# Patient Record
Sex: FEMALE | Employment: FULL TIME | URBAN - METROPOLITAN AREA
[De-identification: names, ages, dates, MRNs, and addresses within clinical notes are randomized per-mention and may not be internally consistent; named-entity substitution may affect disease eponyms.]

---

## 2018-04-04 ENCOUNTER — OFFICE VISIT (OUTPATIENT)
Dept: NEUROLOGY | Age: 58
End: 2018-04-04

## 2018-04-04 VITALS
HEART RATE: 65 BPM | SYSTOLIC BLOOD PRESSURE: 134 MMHG | DIASTOLIC BLOOD PRESSURE: 88 MMHG | WEIGHT: 241 LBS | RESPIRATION RATE: 18 BRPM | BODY MASS INDEX: 42.7 KG/M2 | HEIGHT: 63 IN | OXYGEN SATURATION: 98 %

## 2018-04-04 DIAGNOSIS — Z84.89 FAMILY HISTORY OF BRAIN TUMOR: ICD-10-CM

## 2018-04-04 DIAGNOSIS — R20.2 PARESTHESIA: Primary | ICD-10-CM

## 2018-04-04 DIAGNOSIS — H93.19 TINNITUS, UNSPECIFIED LATERALITY: ICD-10-CM

## 2018-04-04 RX ORDER — LORATADINE 10 MG/1
10 TABLET ORAL
COMMUNITY

## 2018-04-04 NOTE — LETTER
4/4/2018 8:31 AM 
 
Patient:  Rhett Bray YOB: 1960 Date of Visit: 4/4/2018 Dear Soraya Isabel, [facility] [address]: [phone] 
 : 
 
 
Thank you for referring Ms. Rhett Bray to me for evaluation/treatment. Below are the relevant portions of my assessment and plan of care. If you have questions, please do not hesitate to call me. I look forward to following Ms. Huey Guzman along with you.  
 
 
 
Sincerely, 
 
 
Usha Root, DO

## 2018-04-04 NOTE — MR AVS SNAPSHOT
[facility] [address] [place] [address] 
[phone] Patient: Jyothi Sanchez MRN: [id number] :1960 Visit Information Date & Time Provider Department Dept. Phone Encounter #  
 2018  8:00 AM Jodene Favre, DO [facility] at [address] [id number] Follow-up Instructions Return if symptoms worsen or fail to improve. Upcoming Health Maintenance Date Due Hepatitis C Screening 1960 DTaP/Tdap/Td series (1 - Tdap) 1982 PAP AKA CERVICAL CYTOLOGY 1982 BREAST CANCER SCRN MAMMOGRAM 2011 FOBT Q 1 YEAR AGE 50-75 2011 Influenza Age 5 to Adult 2017 Allergies as of 2018  Review Complete On: 2018 By: Lloyd Morel LPN Severity Noted Reaction Type Reactions Mold  2018    Hives Other Plant, Animal, Environmental  2018    Hives  
 pollen Pcn [Penicillins]  2018    Hives Current Immunizations  Never Reviewed No immunizations on file. Not reviewed this visit You Were Diagnosed With   
  
 Codes Comments Paresthesia    -  Primary ICD-10-CM: R20.2 ICD-9-CM: 782.0 Tinnitus, unspecified laterality     ICD-10-CM: H93.19 ICD-9-CM: 388.30 Family history of brain tumor     ICD-10-CM: Z84.89 ICD-9-CM: V19.8 Vitals BP Pulse Resp Height(growth percentile) Weight(growth percentile) SpO2  
 134/88 65 18 5' 3\" (1.6 m) 241 lb (109.3 kg) 98% BMI OB Status Smoking Status 42.69 kg/m2 Hysterectomy Never Smoker Vitals History BMI and BSA Data Body Mass Index Body Surface Area  
 42.69 kg/m 2 2.2 m 2 Your Updated Medication List  
  
   
This list is accurate as of 18  8:26 AM.  Always use your most recent med list.  
  
  
  
  
 CLARITIN 10 mg tablet Generic drug:  loratadine Take 10 mg by mouth. losartan 50 mg tab 50 mg, hydroCHLOROthiazide 12.5 mg cap 12.5 mg Take  by mouth daily. Follow-up Instructions Return if symptoms worsen or fail to improve. To-Do List   
 04/04/2018 Imaging:  MRI BRAIN W WO CONT Patient Instructions A Healthy Lifestyle: Care Instructions Your Care Instructions A healthy lifestyle can help you feel good, stay at a healthy weight, and have plenty of energy for both work and play. A healthy lifestyle is something you can share with your whole family. A healthy lifestyle also can lower your risk for serious health problems, such as high blood pressure, heart disease, and diabetes. You can follow a few steps listed below to improve your health and the health of your family. Follow-up care is a key part of your treatment and safety. Be sure to make and go to all appointments, and call your doctor if you are having problems. It's also a good idea to know your test results and keep a list of the medicines you take. How can you care for yourself at home? · Do not eat too much sugar, fat, or fast foods. You can still have dessert and treats now and then. The goal is moderation. · Start small to improve your eating habits. Pay attention to portion sizes, drink less juice and soda pop, and eat more fruits and vegetables. ¨ Eat a healthy amount of food. A 3-ounce serving of meat, for example, is about the size of a deck of cards. Fill the rest of your plate with vegetables and whole grains. ¨ Limit the amount of soda and sports drinks you have every day. Drink more water when you are thirsty. ¨ Eat at least 5 servings of fruits and vegetables every day. It may seem like a lot, but it is not hard to reach this goal. A serving or helping is 1 piece of fruit, 1 cup of vegetables, or 2 cups of leafy, raw vegetables.  Have an apple or some carrot sticks as an afternoon snack instead of a candy bar. Try to have fruits and/or vegetables at every meal. 
· Make exercise part of your daily routine. You may want to start with simple activities, such as walking, bicycling, or slow swimming. Try to be active 30 to 60 minutes every day. You do not need to do all 30 to 60 minutes all at once. For example, you can exercise 3 times a day for 10 or 20 minutes. Moderate exercise is safe for most people, but it is always a good idea to talk to your doctor before starting an exercise program. 
· Keep moving. Andreia Peterson the lawn, work in the garden, or [place]. Take the stairs instead of the elevator at work. · If you smoke, quit. People who smoke have an increased risk for heart attack, stroke, cancer, and other lung illnesses. Quitting is hard, but there are ways to boost your chance of quitting tobacco for good. ¨ Use nicotine gum, patches, or lozenges. ¨ Ask your doctor about stop-smoking programs and medicines. ¨ Keep trying. In addition to reducing your risk of diseases in the future, you will notice some benefits soon after you stop using tobacco. If you have shortness of breath or asthma symptoms, they will likely get better within a few weeks after you quit. · Limit how much alcohol you drink. Moderate amounts of alcohol (up to 2 drinks a day for men, 1 drink a day for women) are okay. But drinking too much can lead to liver problems, high blood pressure, and other health problems. Family health If you have a family, there are many things you can do together to improve your health. · Eat meals together as a family as often as possible. · Eat healthy foods. This includes fruits, vegetables, lean meats and dairy, and whole grains. · Include your family in your fitness plan. Most people think of activities such as jogging or tennis as the way to fitness, but there are many ways you and your family can be more active.  Anything that makes you breathe hard and gets your heart pumping is exercise. Here are some tips: 
¨ Walk to do errands or to take your child to school or the bus. ¨ Go for a family bike ride after dinner instead of watching TV. Where can you learn more? Go to http://rony-julianne.info/. Enter N174 in the search box to learn more about \"A Healthy Lifestyle: Care Instructions. \" Current as of: May 12, 2017 Content Version: 11.4 © 8089-0509 [employer]. Care instructions adapted under license by [employer] (which disclaims liability or warranty for this information). If you have questions about a medical condition or this instruction, always ask your healthcare professional. [address] any warranty or liability for your use of this information. Introducing [facility]! [employer] introduces [employer] patient portal. Now you can access parts of your medical record, email your doctor's office, and request medication refills online. 1. In your internet browser, go to [email or web address] 2. Click on the First Time User? Click Here link in the Sign In box. You will see the New Member Sign Up page. 3. Enter your [employer]eva Access Code exactly as it appears below. You will not need to use this code after youve completed the sign-up process. If you do not sign up before the expiration date, you must request a new code. · [employer]eva Access Code: [email or web address] Expires: 7/3/2018  7:43 AM 
 
4. Enter the last four digits of your Social Security Number (xxxx) and Date of Birth (mm/dd/yyyy) as indicated and click Submit. You will be taken to the next sign-up page. 5. Create a Veeva ID. This will be your Veeva login ID and cannot be changed, so think of one that is secure and easy to remember. 6. Create a [employer]eva password. You can change your password at any time. 7. Enter your Password Reset Question and Answer.  This can be used at a later time if you forget your password. 8. Enter your e-mail address. You will receive e-mail notification when new information is available in [address] 9. Click Sign Up. You can now view and download portions of your medical record. 10. Click the Download Summary menu link to download a portable copy of your medical information. If you have questions, please visit the Frequently Asked Questions section of the [employer] website. Remember, [employer] is NOT to be used for urgent needs. For medical emergencies, dial 911. Now available from your iPhone and Android! Please provide this summary of care documentation to your next provider. If you have any questions after today's visit, please call [phone].

## 2018-04-04 NOTE — PROGRESS NOTES
[facility]   NEW PATIENT EVALUATION/CONSULTATION       PATIENT NAME: Eusebio Pulliam    MRN: [id number]    REASON FOR CONSULTATION: L head paresthesias    04/04/18      Previous records (physician notes, laboratory reports, and radiology reports) and imaging studies were reviewed and summarized. My recommendations will be communicated back to the patient's physician(s) via electronic medical record and/or by [address] [facility] [facility] mail. HISTORY OF PRESENT ILLNESS:  Eusebio Pluliam is a 63 y.o. right handed female presenting for evaluation of L sided paresthesias. Sx started approximately 3 weeks ago, intermittent and non-progressive since onset. She describes sx as tingling above her L ear with radiating to the occipital region lasting just a few seconds at most.  This occurs several times daily. Reports chronic intermittent tinnitus as well as mild headaches which are occasional.  She states headache are over the left frontal region without associated N/V, photophobia. Denies facial weakness/limb weakness, vision disturbance, dysarthria, dysphagia. She denies h/o paresthesias in the past.  No recent head injury/concussion. She is concerned regarding her +FHx father with brain tumor. No prior brain imaging.         PAST MEDICAL HISTORY:  Past Medical History:   Diagnosis Date    Arthritis     Hypertension     Obesity        PAST SURGICAL HISTORY:  Past Surgical History:   Procedure Laterality Date    HX GYN      HX MYOMECTOMY         FAMILY HISTORY:   Family History   Problem Relation Age of Onset    Diabetes Mother     Hypertension Mother     Cancer Father     Dementia Sister     Diabetes Maternal Grandmother     Hypertension Maternal Grandmother          SOCIAL HISTORY:  Social History     Social History    Marital status: UNKNOWN     Spouse name: N/A    Number of children: N/A    Years of education: N/A     Social History Main Topics    Smoking status: Never Smoker    Smokeless tobacco: Never Used    Alcohol use Yes    Drug use: None    Sexual activity: Not Asked     Other Topics Concern    None     Social History Narrative    None         MEDICATIONS:   Current Outpatient Prescriptions   Medication Sig Dispense Refill    losartan 50 mg tab 50 mg, hydroCHLOROthiazide 12.5 mg cap 12.5 mg Take  by mouth daily.  loratadine (CLARITIN) 10 mg tablet Take 10 mg by mouth. ALLERGIES:  Allergies   Allergen Reactions    Mold Hives    Pcn [Penicillins] Hives         REVIEW OF SYSTEMS:  10 point ROS reviewed with patient. Please see scanned document under media. PHYSICAL EXAM:  Vital Signs:   Visit Vitals    /88    Pulse 65    Resp 18    Wt 109.3 kg (241 lb)        General Medical Exam:  General:  Well appearing, comfortable, in no apparent distress. Eyes/ENT: see cranial nerve examination. Neck: No masses appreciated. Full range of motion without tenderness. Respiratory:  Clear to auscultation, good air entry bilaterally. Cardiac:  Regular rate and rhythm, no murmur. GI:  Soft, non-tender, non-distended abdomen. Bowel sounds normal. No masses, organomegaly. Extremities:  No deformities, edema, or skin discoloration. Skin:  No rashes or lesions. Neurological:  · Mental Status:  Alert and oriented to person, place, and time with fluent speech. · Cranial Nerves:   CNII/III/IV/VI: no papilledema, visual fields full to confrontation, EOMI, PERRL, no ptosis or nystagmus. CN V: Facial sensation intact bilaterally, masseter 5/5   CN VII: Facial muscles symmetric and strong   CN VIII: Hears finger rub well bilaterally, intact vestibular function   CN IX/X: Normal palatal movement   CN XI: Full strength shoulder shrug bilaterally   CN XII: Tongue protrusion full and midline without fasciculation or atrophy  · Motor: Normal tone and muscle bulk with no pronator drift.    Individual muscle group testing:  Shoulder abduction:   Left:5/5   Right : 5/5    Shoulder adduction: Left:5/5   Right : 5/5    Elbow flexion:      Left:5/5   Right : 5/5  Elbow extension:    Left:5/5   Right : 5/5   Wrist flexion:    Left:5/5   Right : 5/5  Wrist extension:    Left:5/5   Right : 5/5  Arm pronation:   Left:5/5   Right : 5/5  Arm supination:   Left:5/5   Right : 5/5    Finger flexion:    Left:5/5   Right : 5/5    Finger extension:   Left:5/5   Right : 5/5   Finger abduction:  Left:5/5   Right : 5/5   Finger adduction:   Left:5/5   Right : 5/5  Hip flexion:     Left:5/5   Right : 5/5         Hip extension:   Left:5/5   Right : 5/5    Knee flexion:     Left:5/5   Right : 5/5    Knee extension:   Left:5/5   Right : 5/5    Dorsiflexion:     Left:5/5   Right : 5/5  Plantar flexion:    Left:5/5   Right : 5/5      · MSRs: No crossed adductors or clonus. RIGHT  LEFT   Brachioradialis 1+ 1+   Biceps 1+ 1+   Triceps 1+ 1+   Knee 1+ 1+   Achilles 1+ 1+        Plantar response Downward Downward          · Sensation: Normal and symmetric perception of pinprick, temperature, light touch, proprioception, and vibration; (-) Romberg. · Coordination: No dysmetria. Normal rapid alternating movements; finger-to-nose and heel-to- shin testing are within normal limits. · Gait: Normal native gait    PERTINENT DATA:  OUTSIDE RECORDS:  The patient provided outside medical records, which were reviewed during the course of the visit. The relevant detail are summarized above. ASSESSMENT:      ICD-10-CM ICD-9-CM    1. Paresthesia R20.2 782.0 losartan 50 mg tab 50 mg, hydroCHLOROthiazide 12.5 mg cap 12.5 mg      loratadine (CLARITIN) 10 mg tablet      MRI BRAIN W WO CONT   2. Tinnitus, unspecified laterality H93.19 388.30 losartan 50 mg tab 50 mg, hydroCHLOROthiazide 12.5 mg cap 12.5 mg      loratadine (CLARITIN) 10 mg tablet      MRI BRAIN W WO CONT   3.  Family history of brain tumor Z84.89 V19.8 losartan 50 mg tab 50 mg, hydroCHLOROthiazide 12.5 mg cap 12.5 mg      loratadine (CLARITIN) 10 mg tablet      MRI BRAIN W WO CONT   63year old AAF presenting with new onset intermittent paresthesias of the L hemisphere x 3 weeks as well as chronic intermittent tinnitus and non-migrainous occasional headaches. Neurological examination is non-focal.  She is very concerned regarding her acute neurological symptoms due to her father's history of GBM. Will obtain MRI brain to exclude intracranial mass/lesion or other acute pathology given her family history of brain tumor. If imaging is unrevealing, L hemispheric paresthesias are most likely secondary to headache-related phenomenon. She was instructed to return with any new or worsening symptoms. PLAN:  · MRI Brain WWO    Follow-up Disposition:  Return if symptoms worsen or fail to improve. I have discussed the diagnosis with the patient and the intended plan as seen in the above orders. Patient is in agreement. The patient has received an after-visit summary and questions were answered concerning future plans. Theodora Whyte DO  Staff Neurologist  Diplomate, [employer] & Neurology       CC Referring provider:  Soraya GEE

## 2018-04-04 NOTE — PATIENT INSTRUCTIONS

## 2018-04-18 ENCOUNTER — HOSPITAL ENCOUNTER (OUTPATIENT)
Dept: MRI IMAGING | Age: 58
Discharge: HOME | End: 2018-04-18
Attending: PSYCHIATRY & NEUROLOGY
Payer: COMMERCIAL

## 2018-04-18 VITALS — WEIGHT: 235 LBS | BODY MASS INDEX: 41.63 KG/M2

## 2018-04-18 DIAGNOSIS — Z84.89 FAMILY HISTORY OF BRAIN TUMOR: ICD-10-CM

## 2018-04-18 DIAGNOSIS — R20.2 PARESTHESIA: ICD-10-CM

## 2018-04-18 DIAGNOSIS — H93.19 TINNITUS, UNSPECIFIED LATERALITY: ICD-10-CM

## 2018-04-18 LAB — CREAT BLD-MCNC: 0.9 MG/DL (ref 0.6–1.3)

## 2018-04-18 PROCEDURE — A9575 INJ GADOTERATE MEGLUMI 0.1ML: HCPCS | Performed by: PSYCHIATRY & NEUROLOGY

## 2018-04-18 PROCEDURE — 74011250636 HC RX REV CODE- 250/636: Performed by: PSYCHIATRY & NEUROLOGY

## 2018-04-18 PROCEDURE — 82565 ASSAY OF CREATININE: CPT

## 2018-04-18 PROCEDURE — 70553 MRI BRAIN STEM W/O & W/DYE: CPT

## 2018-04-18 RX ORDER — GADOTERATE MEGLUMINE 376.9 MG/ML
20 INJECTION INTRAVENOUS
Status: COMPLETED | OUTPATIENT
Start: 2018-04-18 | End: 2018-04-18

## 2018-04-18 RX ORDER — SODIUM CHLORIDE 0.9 % (FLUSH) 0.9 %
10 SYRINGE (ML) INJECTION
Status: COMPLETED | OUTPATIENT
Start: 2018-04-18 | End: 2018-04-18

## 2018-04-18 RX ADMIN — Medication 10 ML: at 21:00

## 2018-04-18 RX ADMIN — GADOTERATE MEGLUMINE 20 ML: 376.9 INJECTION INTRAVENOUS at 21:00

## 2020-03-26 ENCOUNTER — VIRTUAL VISIT (OUTPATIENT)
Dept: NEUROLOGY | Age: 60
End: 2020-03-26

## 2020-03-26 VITALS — RESPIRATION RATE: 18 BRPM

## 2020-03-26 DIAGNOSIS — R20.2 PARESTHESIA OF RIGHT LOWER EXTREMITY: Primary | ICD-10-CM

## 2020-03-26 DIAGNOSIS — M54.10 RADICULAR PAIN OF RIGHT LOWER EXTREMITY: ICD-10-CM

## 2020-03-26 RX ORDER — GABAPENTIN 300 MG/1
300 CAPSULE ORAL 3 TIMES DAILY
Qty: 90 CAP | Refills: 2 | Status: SHIPPED | OUTPATIENT
Start: 2020-03-26 | End: 2020-07-01 | Stop reason: SDUPTHER

## 2020-03-26 NOTE — PROGRESS NOTES
Neurology Clinic Follow up Note    Patient ID:  Alyssa Abraham  [id number]  54 y.o.  1960      Ms. Saint Dolphin is here for follow up today of: New concern, tingling/burning RLE     This is a telemedicine visit that was performed with the originating site at [address] [place] [address] and the distant site at patient's home. Verbal consent to participate in video visit was obtained. The patient was identified by name and date of birth. This visit occurred during the Coronavirus [phone] [facility] Emergency. I discussed with the patient the nature of our telemedicine visits, that:     I would evaluate the patient and recommend diagnostics and treatments based on my assessment   Our sessions are not being recorded and that personal health information is protected   Our team would provide follow up care in person if/when the patient needs it    Interval History:   She reports a tingling/burning sensation into the RLE, intermittent, without associated LBP. Sx started 3-4 years ago. Paresthesias are more apparent over the past 3-4 months. She notes occasional weakness into the LLE, not RLE. No prior EMGs. Symptoms are more bothersome in the evenings. Heat will occasionally help. She had prior Xrays of the lumbar spine without significant findings per patient. PMHx/ PSHx/ FHx/ SHx:  Reviewed and unchanged previous visit. Past Medical History:   Diagnosis Date    Arthritis     Hypertension     Obesity          ROS:  Comprehensive review of systems negative except for as noted above. Objective:       Meds:  Current Outpatient Medications   Medication Sig Dispense Refill    losartan 50 mg tab 50 mg, hydroCHLOROthiazide 12.5 mg cap 12.5 mg Take  by mouth daily.  loratadine (CLARITIN) 10 mg tablet Take 10 mg by mouth. Due to this being a TeleHealth evaluation, many elements of the physical examination are unable to be assessed.    Exam:  NEUROLOGICAL EXAM:  General: Awake, alert, speech fluent  CN: EOMI, facial strength normal and symmetric, hearing is intact  Motor: AG x 4  Reflexes: deferred  Coordination: No ataxia. Sensation: LT intact throughout  Gait: Steady, intact heel/toe walking. LABS  Results for orders placed or performed during the hospital encounter of 04/18/18   POC CREATININE   Result Value Ref Range    Creatinine (POC) 0.9 0.6 - 1.3 mg/dL    GFRAA, POC >60 >60 ml/min/1.73m2    GFRNA, POC >60 >60 ml/min/1.73m2       IMAGING:  MRI Results (most recent):  Results from [place] encounter on 04/18/18   MRI BRAIN W WO CONT    Narrative INDICATION:  L hemispheric paresthesias, FHx GBM     COMPARISON:  None    TECHNIQUE:  MR imaging of the brain was performed with sagittal T1, axial T1,  T2, FLAIR, GRE, DWI/ADC; pre and post contrast multiplanar T1 utilizing 20 mL  gadolinium. FINDINGS:       Ventricles:  Midline, no hydrocephalus. Brain Parenchyma/Brainstem:  Normal for age. No acute infarction. Intracranial Hemorrhage:  None. Basal Cisterns:  Normal.   Flow Voids:  Normal.  Post Contrast:  No abnormal parenchymal or meningeal enhancement. Additional Comments:  Diminished T1 marrow signal throughout the calvarium and  upper cervical spine. Impression IMPRESSION:    1. No significant intracranial abnormality. Specifically, no intracranial mass. 2. Diffuse marrow signal reduction in the upper cervical spine and calvarium. This could be related to a systemic process such as anemia, although is  nonspecific. Correlation recommended. Assessment:     Encounter Diagnoses     ICD-10-CM ICD-9-CM   1. Paresthesia of right lower extremity R20.2 782.0   2. Radicular pain of right lower extremity M54.10 67.4   62year old AAF seen for follow up due to paresthesias of the RLE, intermittent and more apparent over the past 3-4 months.     Due to concern for potential lumbosacral radiculopathy, will proceed with electrodiagnostic testing as outlined below. I have also advised that she start PT which may help address her sciatica. For neuropathic pain symptoms, she will start a trial of low dose gabapentin. She was advised of potential for sedation while on medication. Plan:   EMG RLE- follow up after testing is completed  Trial of gabapentin 300mg TID for neuropathic pain symptoms. Patient advised of potential for sedation/fatigue with medication.   PT RLE    Signed:  Elmer Jauregui DO  3/26/2020

## 2020-03-26 NOTE — PATIENT INSTRUCTIONS
PRESCRIPTION REFILL POLICY [address] [place] [address] Statement to Patients April 1, 2014 In an effort to ensure the large volume of patient prescription refills is processed in the most efficient and expeditious manner, we are asking our patients to assist us by calling your Pharmacy for all prescription refills, this will include also your  Mail Order Pharmacy. The pharmacy will contact our office electronically to continue the refill process. Please do not wait until the last minute to call your pharmacy. We need at least 48 hours (2days) to fill prescriptions. We also encourage you to call your pharmacy before going to  your prescription to make sure it is ready. With regard to controlled substance prescription refill requests (narcotic refills) that need to be picked up at our office, we ask your cooperation by providing us with at least 72 hours (3days) notice that you will need a refill. We will not refill narcotic prescription refill requests after 4:00pm on any weekday, Monday through Thursday, or after 2:00pm on Fridays, or on the weekends. We encourage everyone to explore another way of getting your prescription refill request processed using [email or web address], our patient web portal through our electronic medical record system. [email or web address] is an efficient and effective way to communicate your medication request directly to the office and  downloadable as an favio on your smart phone . [email or web address] also features a review functionality that allows you to view your medication list as well as leave messages for your physician. Are you ready to get connected? If so please review the attatched instructions or speak to any of our staff to get you set up right away! Thank you so much for your cooperation. Should you have any questions please contact our Practice Administrator. The Physicians and Staff,  [address] [place] [address]

## 2020-06-11 ENCOUNTER — TELEPHONE (OUTPATIENT)
Dept: NEUROLOGY | Age: 60
End: 2020-06-11

## 2020-06-11 NOTE — TELEPHONE ENCOUNTER
----- Message from Sandy Sosa sent at 6/10/2020  4:30 PM EDT -----  Regarding: Dr Laird/Telephone  General Message/Vendor Calls    Caller's first and last name:      Reason for call: Pt is requesting to be scheduled for a EMG      Callback required yes/no and why: yes      Best contact number(s):[phone]      Details to clarify the request:      Sandy Sosa

## 2020-07-01 ENCOUNTER — OFFICE VISIT (OUTPATIENT)
Dept: NEUROLOGY | Age: 60
End: 2020-07-01

## 2020-07-01 VITALS
HEART RATE: 78 BPM | BODY MASS INDEX: 41.64 KG/M2 | WEIGHT: 235 LBS | OXYGEN SATURATION: 98 % | DIASTOLIC BLOOD PRESSURE: 90 MMHG | RESPIRATION RATE: 16 BRPM | HEIGHT: 63 IN | SYSTOLIC BLOOD PRESSURE: 140 MMHG

## 2020-07-01 DIAGNOSIS — M54.10 RADICULAR PAIN OF RIGHT LOWER EXTREMITY: ICD-10-CM

## 2020-07-01 DIAGNOSIS — R20.2 PARESTHESIA OF RIGHT LOWER EXTREMITY: Primary | ICD-10-CM

## 2020-07-01 PROBLEM — E66.01 OBESITY, MORBID (HCC): Status: ACTIVE | Noted: 2020-07-01

## 2020-07-01 RX ORDER — GABAPENTIN 300 MG/1
300 CAPSULE ORAL 3 TIMES DAILY
Qty: 90 CAP | Refills: 2 | Status: SHIPPED | OUTPATIENT
Start: 2020-07-01

## 2020-07-01 NOTE — PROGRESS NOTES
[address] [facility]  [address] [facility], [address] [place] [address]  Phone [phone] Fax [phone]  Test Date:  2020    Patient: Cici Morton : 1960 Physician: Blanca Knutson. Aym AnalyDO leena   Sex: Female Height: 5' 3\" Ref Phys: Blanca Knutson. Amy Schwartz, DO   ID#: [id number] Weight: 253 lbs. Technician: Nava Elliott. .EMG TECH     Patient Complaints:  Paresthesias of the right lower extremity    NCV & EMG Findings:  All nerve conduction studies were within normal limits. All F Wave latencies were within normal limits. All examined muscles (as indicated in the following table) showed no evidence of electrical instability. Impression:  Extensive electrodiagnostic examination of the right lower extremity reveals no abnormalities. In particular, there is no evidence of a right lumbosacral motor radiculopathy. In addition, there is no evidence of a generalized sensorimotor polyneuropathy. ___________________________  Mary Bores  Amy Schwartz, DO        Nerve Conduction Studies  Anti Sensory Summary Table     Stim Site NR Peak (ms) Norm Peak (ms) P-T Amp (µV) Norm P-T Amp Onset (ms) Site1 Site2 Delta-P (ms) Dist (cm) Tio (m/s) Norm Tio (m/s)   Right Sup Peroneal Anti Sensory (Ant Lat Mall)  32.5°C   14 cm    2.3 <4.4 30.0 >5.0 1.9 14 cm Ant Lat Mall 2.3 10.0 43 >32   Right Sural Anti Sensory (Lat Mall)  32.1°C   Calf    3.2 <4.0 17.9 >5.0 2.8 Calf Lat Mall 3.2 14.0 44 >35     Motor Summary Table     Stim Site NR Onset (ms) Norm Onset (ms) O-P Amp (mV) Norm O-P Amp Site1 Site2 Delta-0 (ms) Dist (cm) Tio (m/s) Norm Tio (m/s)   Right Peroneal Motor (Ext Dig Brev)  30.9°C   Ankle    3.0 <6.1 6.3 >2.5 B Fib Ankle 5.6 32.0 57 >38   B Fib    8.6  5.9  Poplt B Fib 1.6 10.0 63 >40   Poplt    10.2  5.6          Right Tibial Motor (Abd Verdugo Brev)  31.6°C   Ankle    2.9 <6.1 8.0 >3.0 Knee Ankle 7.3 36.0 49 >35   Knee    10.2  6.2            F Wave Studies     NR F-Lat (ms) Lat Norm (ms) L-R F-Lat (ms) L-R Lat Norm   Right Tibial (Mrkrs) (Abd Hallucis)  32.1°C      46.36 <61  <5.7     EMG     Side Muscle Nerve Root Ins Act Fibs Psw Amp Dur Poly Recrt Int Kathline Libel Comment   Right Ext Dig Brev Dp Br Peronel L5, S1 Nml Nml Nml Nml Nml 0 Nml Nml    Right AbdHallucis MedPlantar S1-2 Nml Nml Nml Nml Nml 0 Nml Nml    Right PostTibialis Tibial L5, S1 Nml Nml Nml Nml Nml 0 Nml Nml    Right Gastroc Tibial S1-2 Nml Nml Nml Nml Nml 0 Nml Nml    Right AntTibialis Dp Br Peronel L4-5 Nml Nml Nml Nml Nml 0 Nml Nml    Right RectFemoris Femoral L2-4 Nml Nml Nml Nml Nml 0 Nml Nml    Right Lumbo Parasp Low Rami L5-S1 Nml Nml Nml               Waveforms:

## 2022-03-19 PROBLEM — E66.01 OBESITY, MORBID (HCC): Status: ACTIVE | Noted: 2020-07-01

## 2023-05-18 RX ORDER — GABAPENTIN 300 MG/1
300 CAPSULE ORAL 3 TIMES DAILY
COMMUNITY
Start: 2020-07-01

## 2023-05-18 RX ORDER — LORATADINE 10 MG/1
10 TABLET ORAL
COMMUNITY